# Patient Record
Sex: FEMALE
[De-identification: names, ages, dates, MRNs, and addresses within clinical notes are randomized per-mention and may not be internally consistent; named-entity substitution may affect disease eponyms.]

---

## 2018-12-18 PROBLEM — Z00.00 ENCOUNTER FOR PREVENTIVE HEALTH EXAMINATION: Status: ACTIVE | Noted: 2018-12-18

## 2019-01-17 ENCOUNTER — APPOINTMENT (OUTPATIENT)
Dept: UROGYNECOLOGY | Facility: CLINIC | Age: 39
End: 2019-01-17
Payer: MEDICAID

## 2019-01-17 VITALS
SYSTOLIC BLOOD PRESSURE: 121 MMHG | DIASTOLIC BLOOD PRESSURE: 78 MMHG | BODY MASS INDEX: 25.21 KG/M2 | WEIGHT: 137 LBS | HEIGHT: 62 IN

## 2019-01-17 DIAGNOSIS — N32.81 OVERACTIVE BLADDER: ICD-10-CM

## 2019-01-17 DIAGNOSIS — R39.15 URGENCY OF URINATION: ICD-10-CM

## 2019-01-17 DIAGNOSIS — N39.46 MIXED INCONTINENCE: ICD-10-CM

## 2019-01-17 DIAGNOSIS — N81.6 RECTOCELE: ICD-10-CM

## 2019-01-17 DIAGNOSIS — N39.41 URGE INCONTINENCE: ICD-10-CM

## 2019-01-17 DIAGNOSIS — R35.0 FREQUENCY OF MICTURITION: ICD-10-CM

## 2019-01-17 LAB
BILIRUB UR QL STRIP: NEGATIVE
CLARITY UR: CLEAR
COLLECTION METHOD: NORMAL
GLUCOSE UR-MCNC: NEGATIVE
HCG UR QL: 0.2 EU/DL
HGB UR QL STRIP.AUTO: NEGATIVE
KETONES UR-MCNC: NEGATIVE
LEUKOCYTE ESTERASE UR QL STRIP: NEGATIVE
NITRITE UR QL STRIP: NEGATIVE
PH UR STRIP: 6.5
PROT UR STRIP-MCNC: NEGATIVE
SP GR UR STRIP: 1

## 2019-01-17 PROCEDURE — 81003 URINALYSIS AUTO W/O SCOPE: CPT | Mod: QW

## 2019-01-17 PROCEDURE — 51701 INSERT BLADDER CATHETER: CPT

## 2019-01-17 PROCEDURE — 99204 OFFICE O/P NEW MOD 45 MIN: CPT | Mod: 25

## 2019-01-17 NOTE — CHIEF COMPLAINT
[Questionnaire Received] : Patient questionnaire received [Poor Bladder Control] : poor bladder control [Urine Frequency] : urine frequency

## 2019-01-17 NOTE — REASON FOR VISIT
[Initial Visit ___] : [unfilled] is here today for an initial visit  for [unfilled] [Spouse] : spouse [Patient Declined  Services] : - None: Patient declined  services

## 2019-01-17 NOTE — PROCEDURE
[FreeTextEntry1] : sterile straight catheterization performed to assess post void residual due to stress incontinence

## 2019-01-17 NOTE — OB HISTORY
[Vaginal ___] : [unfilled] vaginal delivery(s) [ ___] : [unfilled]  section delivery(s) [Definite ___ (Date)] : the last menstrual period was [unfilled] [Regular Cycle Intervals] : periods have been regular [Sexually Active] : sexually active [Abnormal Pap Smear] : normal pap smear [Taking Estrogens] : is not taking estrogen replacement [Abnormal Bleeding] : without bleeding

## 2019-01-17 NOTE — HISTORY OF PRESENT ILLNESS
[FreeTextEntry1] :  39 y/o female presents with complaints of mixed incontinence for many years, reports if she has to go to the bathroom she cannot hold her urine and also leaking with sneezing/coughing, reports urge incontinence is worse, reports daytime frequency 8-9 times/day, urgency, 2-3 pads/day, denies nocturia, denies hematuria, denies dysuria, rare pelvic cramping, denies incomplete emptying, denies intermittent stream, occasional feels vaginal bulge,  reports constipation controlled with stool softner, sexually active not painful,  no longer desires child bearing\par \par daily intake: 100 ounces of water, 8 ounces of coffee\par PSH: c/s X 1

## 2019-01-17 NOTE — PHYSICAL EXAM
[No Acute Distress] : in no acute distress [Well developed] : well developed [Well Nourished] : ~L well nourished [Oriented x3] : oriented to person, place, and time [Normal Memory] : ~T memory was ~L unimpaired [Normal Mood/Affect] : mood and affect are normal [Cough] : no cough [No Edema] : ~T edema was not present [Tenderness] : ~T no ~M abdominal tenderness observed [Distended] : not distended [None] : no CVA tenderness [Inguinal LAD] : no adenopathy was noted in the inguinal lymph nodes [Warm and Dry] : was warm and dry to touch [Labia Majora] : were normal [Labia Minora] : were normal [Normal Appearance] : general appearance was normal [No Bleeding] : there was no active vaginal bleeding [3] : 3 [Ap ____] : Ap [unfilled] [Bp ____] : Bp [unfilled] [Normal] : normal [Soft] :  the cervix was soft [Uterine Adnexae] : were not tender and not enlarged [Post Void Residual ____ml] : post void residual via catheterization was [unfilled] ml [FreeTextEntry3] : neg CST

## 2019-01-17 NOTE — DISCUSSION/SUMMARY
[FreeTextEntry1] : \par Sayda presents with her  with complaints of stress and urge incontinence, her symptoms are urge predominant. On exam PVR 20cc, stage II rectocele noted no apical descent and negative CST.\par \par 1. Overactive bladder: We reviewed her symptoms and exam findings. We discussed management options for overactive bladder including observation, behavorial modifications and bladder training, physical therapy and medications including anticholinergics and beta 3 agonists. We discussed if behavorial modifications and medications fail proceeding with urodynamics to further evaluate her symptoms. We discussed additional treatment options including sacral neuromodulation, PTNS and intra detrusor Botox. [facility] handout on overactive bladder and bladder training was given to her. Not available in [facility]. She would like to start a medication in addition to bladder training. Oxybutynin 10mg ER sent to pharmacy. Reviewed side effects.\par \par 2. Stress incontinence:  We discussed management options including observation, pelvic floor exercises with or without physical therapy, pessary, impressa, medications including imipramine and surgical management including urethral bulking agents, fascial sling, lindsey and midurethral sling with mesh. [facility] handout on ARTHUR given to patient in english. This is not her primary complaint and will readdress if it becomes bothersome.\par \par 3. Rectocele/Constipation: Vaginal bulge not bothersome, discussed management of constipation with Miralax and colace. Discussed if constipation worsens with oxybutynin then we would need to change medication. She does c/o of hemorrhoids and asked for a colorectal Dr. I gave her Dr. Griggs.\par I was able to answer all her questions\par \par [] u/a and culture\par [] oxybutynin 10mg ER\par [] behavorial modifications\par [] bowel regimen\par [] return in 6 weeks for reevaluation of symptoms

## 2019-01-18 ENCOUNTER — RESULT REVIEW (OUTPATIENT)
Age: 39
End: 2019-01-18

## 2019-01-18 LAB
APPEARANCE: CLEAR
BACTERIA: NEGATIVE
BILIRUBIN URINE: NEGATIVE
BLOOD URINE: NEGATIVE
COLOR: YELLOW
GLUCOSE QUALITATIVE U: NEGATIVE MG/DL
HYALINE CASTS: 2 /LPF
KETONES URINE: NEGATIVE
LEUKOCYTE ESTERASE URINE: NEGATIVE
MICROSCOPIC-UA: NORMAL
NITRITE URINE: NEGATIVE
PH URINE: 6.5
PROTEIN URINE: NEGATIVE MG/DL
RED BLOOD CELLS URINE: 1 /HPF
SPECIFIC GRAVITY URINE: 1.01
SQUAMOUS EPITHELIAL CELLS: 1 /HPF
UROBILINOGEN URINE: NEGATIVE MG/DL
WHITE BLOOD CELLS URINE: 0 /HPF

## 2019-01-21 ENCOUNTER — RESULT REVIEW (OUTPATIENT)
Age: 39
End: 2019-01-21

## 2019-01-21 LAB — BACTERIA UR CULT: NORMAL

## 2019-03-11 ENCOUNTER — APPOINTMENT (OUTPATIENT)
Dept: UROGYNECOLOGY | Facility: CLINIC | Age: 39
End: 2019-03-11

## 2019-04-09 ENCOUNTER — RX RENEWAL (OUTPATIENT)
Age: 39
End: 2019-04-09

## 2019-04-10 RX ORDER — OXYBUTYNIN CHLORIDE 10 MG/1
10 TABLET, EXTENDED RELEASE ORAL
Qty: 30 | Refills: 2 | Status: ACTIVE | COMMUNITY
Start: 2019-01-17 | End: 1900-01-01

## 2019-06-10 ENCOUNTER — RX RENEWAL (OUTPATIENT)
Age: 39
End: 2019-06-10

## 2019-06-21 ENCOUNTER — RX RENEWAL (OUTPATIENT)
Age: 39
End: 2019-06-21

## 2019-07-22 ENCOUNTER — RX RENEWAL (OUTPATIENT)
Age: 39
End: 2019-07-22

## 2024-08-19 ENCOUNTER — NON-APPOINTMENT (OUTPATIENT)
Age: 44
End: 2024-08-19

## 2024-10-31 ENCOUNTER — NON-APPOINTMENT (OUTPATIENT)
Age: 44
End: 2024-10-31